# Patient Record
Sex: MALE | URBAN - METROPOLITAN AREA
[De-identification: names, ages, dates, MRNs, and addresses within clinical notes are randomized per-mention and may not be internally consistent; named-entity substitution may affect disease eponyms.]

---

## 2023-01-01 ENCOUNTER — NURSING HOME VISIT (OUTPATIENT)
Dept: POST ACUTE CARE | Facility: EXTERNAL LOCATION | Age: 73
End: 2023-01-01
Payer: MEDICARE

## 2023-01-01 DIAGNOSIS — E55.9 VITAMIN D DEFICIENCY: ICD-10-CM

## 2023-01-01 DIAGNOSIS — J44.89 COPD (CHRONIC OBSTRUCTIVE PULMONARY DISEASE) WITH CHRONIC BRONCHITIS (MULTI): Primary | ICD-10-CM

## 2023-01-01 DIAGNOSIS — J44.9 CHRONIC OBSTRUCTIVE PULMONARY DISEASE, UNSPECIFIED COPD TYPE (MULTI): ICD-10-CM

## 2023-01-01 DIAGNOSIS — K59.00 CONSTIPATION, UNSPECIFIED CONSTIPATION TYPE: ICD-10-CM

## 2023-01-01 DIAGNOSIS — E53.8 VITAMIN B 12 DEFICIENCY: ICD-10-CM

## 2023-01-01 DIAGNOSIS — R62.7 FAILURE TO THRIVE IN ADULT: ICD-10-CM

## 2023-01-01 DIAGNOSIS — J44.9 CHRONIC OBSTRUCTIVE PULMONARY DISEASE, UNSPECIFIED COPD TYPE (MULTI): Primary | ICD-10-CM

## 2023-01-01 DIAGNOSIS — J96.10 CHRONIC RESPIRATORY FAILURE, UNSPECIFIED WHETHER WITH HYPOXIA OR HYPERCAPNIA (MULTI): Primary | ICD-10-CM

## 2023-01-01 DIAGNOSIS — J96.10 CHRONIC RESPIRATORY FAILURE, UNSPECIFIED WHETHER WITH HYPOXIA OR HYPERCAPNIA (MULTI): ICD-10-CM

## 2023-01-01 DIAGNOSIS — J43.8 OTHER EMPHYSEMA (MULTI): Primary | ICD-10-CM

## 2023-01-01 DIAGNOSIS — F41.9 ANXIETY: ICD-10-CM

## 2023-01-01 PROCEDURE — 99309 SBSQ NF CARE MODERATE MDM 30: CPT | Performed by: NURSE PRACTITIONER

## 2023-01-01 PROCEDURE — 99304 1ST NF CARE SF/LOW MDM 25: CPT | Performed by: INTERNAL MEDICINE

## 2023-01-01 PROCEDURE — 99307 SBSQ NF CARE SF MDM 10: CPT | Performed by: NURSE PRACTITIONER

## 2023-01-01 PROCEDURE — 99308 SBSQ NF CARE LOW MDM 20: CPT | Performed by: INTERNAL MEDICINE

## 2023-01-01 PROCEDURE — 99308 SBSQ NF CARE LOW MDM 20: CPT | Performed by: NURSE PRACTITIONER

## 2023-01-01 ASSESSMENT — ENCOUNTER SYMPTOMS
APPETITE CHANGE: 1
SHORTNESS OF BREATH: 1
FEVER: 0
APPETITE CHANGE: 1
FATIGUE: 1
APPETITE CHANGE: 1
WHEEZING: 0
ABDOMINAL PAIN: 0
NAUSEA: 0
ABDOMINAL PAIN: 0
COUGH: 0
CHILLS: 0
DIAPHORESIS: 0
COUGH: 0
BACK PAIN: 1
WHEEZING: 0
FATIGUE: 1
GASTROINTESTINAL NEGATIVE: 1
VOMITING: 0
WEAKNESS: 1
CONSTIPATION: 1
COUGH: 1
ABDOMINAL PAIN: 1
PALPITATIONS: 0
PALPITATIONS: 0
FEVER: 0
FATIGUE: 1
DIARRHEA: 0
WHEEZING: 0
NAUSEA: 0
PALPITATIONS: 0
SHORTNESS OF BREATH: 1
VOMITING: 0
FATIGUE: 1
WEAKNESS: 1
DIARRHEA: 0
SHORTNESS OF BREATH: 1
SHORTNESS OF BREATH: 1
CONSTIPATION: 1
FATIGUE: 1
PALPITATIONS: 0
SHORTNESS OF BREATH: 1
FEVER: 0
PALPITATIONS: 0
FEVER: 0
COUGH: 0

## 2023-09-04 NOTE — LETTER
Patient: Chaz Ojeda  : 1950    Encounter Date: 2023    Pt was seen in the NH.  73 YO M  WITH PMH COPD HERE FOR CARE AFTER DC FOR COPD EXACERATION FEELS BETTER  General appearance: Comfortable, no distress  ROS: No SOB  Medications reviewed  Head: Normal  Neck: Soft  Heart: Regular  Lungs: DIMINISHED BS , SLIGHT WHEEZE  Abdomen: soft    Impression: REHAB, SUPPORTIVE CARE, continue current management    Problem List Items Addressed This Visit    None  Visit Diagnoses       Chronic obstructive pulmonary disease, unspecified COPD type (CMS/HCC)    -  Primary    Chronic respiratory failure, unspecified whether with hypoxia or hypercapnia (CMS/HCC)                   Electronically Signed By: Fco Jay MD   23  9:55 PM

## 2023-09-05 NOTE — LETTER
Patient: Chaz Ojeda  : 1950    Encounter Date: 2023    Subjective  Patient ID: Chaz Ojeda is a 72 y.o. male who presents for No chief complaint on file..  73 yo male at [facility] in rehab for weakness, GERD, Hyperlipidemia, GERD, FTT, anxiety and COPD.  Resident in room sitting in recliner chair.  States he has weakness and sob on minimal activity.  States he is getting anxiety at night and cannot sleep due to the anxiety.  Also c/o some constipation, recalls last BM 3 days ago.          Review of Systems   Constitutional:  Positive for appetite change and fatigue. Negative for chills and fever.   HENT: Negative.     Respiratory:  Positive for shortness of breath. Negative for cough and wheezing.    Cardiovascular:  Positive for leg swelling. Negative for chest pain and palpitations.   Gastrointestinal:  Positive for constipation. Negative for abdominal pain, diarrhea, nausea and vomiting.   Musculoskeletal:  Positive for back pain.   Neurological:  Positive for weakness.       Objective  Physical Exam  Constitutional:       General: He is not in acute distress.     Appearance: He is ill-appearing.   Cardiovascular:      Rate and Rhythm: Normal rate.      Pulses: Normal pulses.      Heart sounds: Normal heart sounds.   Pulmonary:      Effort: Pulmonary effort is normal.      Breath sounds: No wheezing, rhonchi or rales.      Comments: Barrel chest noted.  Diminished lung sounds posterior lower bases.  02 2L NC.    Abdominal:      General: Bowel sounds are normal. There is no distension.      Tenderness: There is no abdominal tenderness.   Skin:     General: Skin is warm and dry.   Neurological:      Mental Status: He is alert.      Motor: Weakness present.         No current outpatient medications on file.     Assessment/Plan  Problem List Items Addressed This Visit          Gastrointestinal and Abdominal    Constipation       Mental Health    Anxiety       Pulmonary and Pneumonias    Other emphysema  (CMS/[facility]) - Primary   Start on Miralax 70gm/1scoop daily prn for constipation.    He already has an order for Xanax 0.25mg q12hrs prn.  Will schedule Xanax 0.25mg at hs to help with his anxiety at hs.    Continue same meds for copd at this time.  Continue to monitor.             Electronically Signed By: BEAN Rehman   9/7/23 10:08 AM

## 2023-09-05 NOTE — PROGRESS NOTES
Pt was seen in the NH.  71 YO M  WITH PMH COPD HERE FOR CARE AFTER DC FOR COPD EXACERATION FEELS BETTER  General appearance: Comfortable, no distress  ROS: No SOB  Medications reviewed  Head: Normal  Neck: Soft  Heart: Regular  Lungs: DIMINISHED BS , SLIGHT WHEEZE  Abdomen: soft    Impression: REHAB, SUPPORTIVE CARE, continue current management    Problem List Items Addressed This Visit    None  Visit Diagnoses       Chronic obstructive pulmonary disease, unspecified COPD type (CMS/HCC)    -  Primary    Chronic respiratory failure, unspecified whether with hypoxia or hypercapnia (CMS/HCC)

## 2023-09-06 NOTE — LETTER
Patient: Chaz Ojeda  : 1950    Encounter Date: 2023    Subjective  Patient ID: Chaz Ojeda is a 72 y.o. male who presents for No chief complaint on file..  73 yo male at [facility] on rehab with history of copd, FTT.  Family and POA discussed discontinuing medications and considering Hospice in near future with nursing staff.  Resident in room states he is concerned he is not getting enough calories as recommended by hospital nutritionist.  Resident also concerned with cost of immunoglobulin injections monthly and his inhalers.  Labs reviewed with nursing staff.          Review of Systems   Constitutional:  Positive for appetite change and fatigue.   Respiratory:  Positive for shortness of breath.    Cardiovascular:  Positive for leg swelling. Negative for chest pain and palpitations.   Gastrointestinal:  Positive for constipation. Negative for abdominal pain, diarrhea, nausea and vomiting.       Objective  Physical Exam  Constitutional:       General: He is not in acute distress.     Appearance: He is ill-appearing.   Pulmonary:      Breath sounds: No wheezing, rhonchi or rales.      Comments: Diminished lung sounds posteriorly on expiration.  Barrel chest  Abdominal:      General: Bowel sounds are normal.   Neurological:      Mental Status: He is alert.         No current outpatient medications on file.     Assessment/Plan  Problem List Items Addressed This Visit          Endocrine/Metabolic    Failure to thrive in adult    Vitamin D deficiency    Vitamin B 12 deficiency       Pulmonary and Pneumonias    Other emphysema (CMS/HCC) - Primary   Will refer resident to [facility] nutritionist.  Resident weights daily.  Current weight 95#.    Staff to discuss with family if they want to pursue Vit B12 deficiency and Vit D deficiency; may help with his fatigue.    Discontinue Remeron, IV immunoglobulin, Reglan, Advair at the families request.    Hold Dexamethasone 4mg q8hrs until finished with tapering dose, then  resume Dexamethasone at 4mg BID to decrease possible insomnia.  Continue Lisinopril at 20mg BID.    Discussed with nursing staff.  Will continue to monitor.  Continue supportive care.             Electronically Signed By: BEAN Rehman   9/7/23 10:19 AM

## 2023-09-07 PROBLEM — E53.8 VITAMIN B 12 DEFICIENCY: Status: ACTIVE | Noted: 2023-01-01

## 2023-09-07 PROBLEM — J43.8 OTHER EMPHYSEMA (MULTI): Status: ACTIVE | Noted: 2023-01-01

## 2023-09-07 PROBLEM — K59.00 CONSTIPATION: Status: ACTIVE | Noted: 2023-01-01

## 2023-09-07 PROBLEM — R62.7 FAILURE TO THRIVE IN ADULT: Status: ACTIVE | Noted: 2023-01-01

## 2023-09-07 PROBLEM — E55.9 VITAMIN D DEFICIENCY: Status: ACTIVE | Noted: 2023-01-01

## 2023-09-07 PROBLEM — F41.9 ANXIETY: Status: ACTIVE | Noted: 2023-01-01

## 2023-09-07 NOTE — PROGRESS NOTES
Subjective   Patient ID: Chaz Ojeda is a 72 y.o. male who presents for No chief complaint on file..  73 yo male at [facility] on rehab with history of copd, FTT.  Family and POA discussed discontinuing medications and considering Hospice in near future with nursing staff.  Resident in room states he is concerned he is not getting enough calories as recommended by hospital nutritionist.  Resident also concerned with cost of immunoglobulin injections monthly and his inhalers.  Labs reviewed with nursing staff.          Review of Systems   Constitutional:  Positive for appetite change and fatigue.   Respiratory:  Positive for shortness of breath.    Cardiovascular:  Positive for leg swelling. Negative for chest pain and palpitations.   Gastrointestinal:  Positive for constipation. Negative for abdominal pain, diarrhea, nausea and vomiting.       Objective   Physical Exam  Constitutional:       General: He is not in acute distress.     Appearance: He is ill-appearing.   Pulmonary:      Breath sounds: No wheezing, rhonchi or rales.      Comments: Diminished lung sounds posteriorly on expiration.  Barrel chest  Abdominal:      General: Bowel sounds are normal.   Neurological:      Mental Status: He is alert.         No current outpatient medications on file.     Assessment/Plan   Problem List Items Addressed This Visit          Endocrine/Metabolic    Failure to thrive in adult    Vitamin D deficiency    Vitamin B 12 deficiency       Pulmonary and Pneumonias    Other emphysema (CMS/HCC) - Primary   Will refer resident to [facility] nutritionist.  Resident weights daily.  Current weight 95#.    Staff to discuss with family if they want to pursue Vit B12 deficiency and Vit D deficiency; may help with his fatigue.    Discontinue Remeron, IV immunoglobulin, Reglan, Advair at the families request.    Hold Dexamethasone 4mg q8hrs until finished with tapering dose, then resume Dexamethasone at 4mg BID to decrease possible insomnia.   Continue Lisinopril at 20mg BID.    Discussed with nursing staff.  Will continue to monitor.  Continue supportive care.

## 2023-09-07 NOTE — PROGRESS NOTES
Subjective   Patient ID: Chaz Ojeda is a 72 y.o. male who presents for No chief complaint on file..  71 yo male at [facility] in rehab for weakness, GERD, Hyperlipidemia, GERD, FTT, anxiety and COPD.  Resident in room sitting in recliner chair.  States he has weakness and sob on minimal activity.  States he is getting anxiety at night and cannot sleep due to the anxiety.  Also c/o some constipation, recalls last BM 3 days ago.          Review of Systems   Constitutional:  Positive for appetite change and fatigue. Negative for chills and fever.   HENT: Negative.     Respiratory:  Positive for shortness of breath. Negative for cough and wheezing.    Cardiovascular:  Positive for leg swelling. Negative for chest pain and palpitations.   Gastrointestinal:  Positive for constipation. Negative for abdominal pain, diarrhea, nausea and vomiting.   Musculoskeletal:  Positive for back pain.   Neurological:  Positive for weakness.       Objective   Physical Exam  Constitutional:       General: He is not in acute distress.     Appearance: He is ill-appearing.   Cardiovascular:      Rate and Rhythm: Normal rate.      Pulses: Normal pulses.      Heart sounds: Normal heart sounds.   Pulmonary:      Effort: Pulmonary effort is normal.      Breath sounds: No wheezing, rhonchi or rales.      Comments: Barrel chest noted.  Diminished lung sounds posterior lower bases.  02 2L NC.    Abdominal:      General: Bowel sounds are normal. There is no distension.      Tenderness: There is no abdominal tenderness.   Skin:     General: Skin is warm and dry.   Neurological:      Mental Status: He is alert.      Motor: Weakness present.         No current outpatient medications on file.     Assessment/Plan   Problem List Items Addressed This Visit          Gastrointestinal and Abdominal    Constipation       Mental Health    Anxiety       Pulmonary and Pneumonias    Other emphysema (CMS/HCC) - Primary   Start on Miralax 70gm/1scoop daily prn for  constipation.    He already has an order for Xanax 0.25mg q12hrs prn.  Will schedule Xanax 0.25mg at hs to help with his anxiety at hs.    Continue same meds for copd at this time.  Continue to monitor.

## 2023-09-08 NOTE — LETTER
Patient: Chaz Ojeda  : 1950    Encounter Date: 2023    Pt was seen in the NH.  Pt has off and on confusion,  General appearance: Comfortable, no distress  ROS: No SOB  Medications reviewed  Head: Normal  Neck: Soft  Heart: Regular  Lungs: diminished BS, slight wheezing  Abdomen: soft    Impression: has advanced disease , spoke with pt, comfort care recommended , pt in agreement, hopice referral, add z pac, UA Bw and CXR, prognosis poor, continue current management    Problem List Items Addressed This Visit       Failure to thrive in adult     Other Visit Diagnoses       Chronic respiratory failure, unspecified whether with hypoxia or hypercapnia (CMS/HCC)    -  Primary    Chronic obstructive pulmonary disease, unspecified COPD type (CMS/HCC)                   Electronically Signed By: Fco Jay MD   23 10:08 PM

## 2023-09-09 NOTE — PROGRESS NOTES
Pt was seen in the NH.  Pt has off and on confusion,  General appearance: Comfortable, no distress  ROS: No SOB  Medications reviewed  Head: Normal  Neck: Soft  Heart: Regular  Lungs: diminished BS, slight wheezing  Abdomen: soft    Impression: has advanced disease , spoke with pt, comfort care recommended , pt in agreement, hopice referral, add z pac, UA Bw and CXR, prognosis poor, continue current management    Problem List Items Addressed This Visit       Failure to thrive in adult     Other Visit Diagnoses       Chronic respiratory failure, unspecified whether with hypoxia or hypercapnia (CMS/HCC)    -  Primary    Chronic obstructive pulmonary disease, unspecified COPD type (CMS/HCC)

## 2023-09-11 NOTE — LETTER
Patient: Chaz Ojeda  : 1950    Encounter Date: 2023    Subjective  Patient ID: Chaz Ojeda is a 72 y.o. male who presents for No chief complaint on file..  71 yo male with history of resp failure, copd, hypoxemia.  Labs returned and CXR shows no acute process.  Currently on 4L 02 at 97% pulse ox.          Review of Systems   Constitutional:  Positive for fatigue. Negative for fever.   Respiratory:  Positive for shortness of breath. Negative for cough and wheezing.    Cardiovascular:  Positive for leg swelling. Negative for chest pain and palpitations.   Gastrointestinal: Negative.        Objective  Physical Exam  Constitutional:       General: He is not in acute distress.     Appearance: He is ill-appearing.   Cardiovascular:      Rate and Rhythm: Normal rate and regular rhythm.   Pulmonary:      Comments: Lungs diminished throughout A&P. Pulse ox 97% on RA.    Neurological:      Mental Status: He is alert.         No current outpatient medications on file.     Assessment/Plan  Problem List Items Addressed This Visit          Pulmonary and Pneumonias    COPD (chronic obstructive pulmonary disease) with chronic bronchitis (CMS/HCC) - Primary   Family has decided to wait on Hospice referral.  CXR shows no acute process; mild cardiomegaly.  Elevation of WBC.  Put on Zpak.  Continue to monitor.            Electronically Signed By: DARSHANA Rehman-CNP   23  2:45 PM

## 2023-09-12 NOTE — LETTER
Patient: Chaz Ojeda  : 1950    Encounter Date: 2023    Subjective  Patient ID: Chaz Ojeda is a 72 y.o. male who presents for No chief complaint on file..  73 yo male with history of resp failure, copd, hypoxemia.  Resident in and out of sleep during interview and exam.  States earlier today he was feeling nauseated but improved now.  C/o back pain however, reports his wife has always made him stay off narcotics for pain so he is reluctant to take anything but Tylenol.          Review of Systems   Constitutional:  Positive for fatigue. Negative for diaphoresis and fever.   Respiratory:  Positive for shortness of breath. Negative for cough and wheezing.    Cardiovascular:  Positive for leg swelling. Negative for chest pain and palpitations.       Objective  Physical Exam  Constitutional:       General: He is not in acute distress.     Appearance: He is ill-appearing.   Cardiovascular:      Rate and Rhythm: Normal rate.   Pulmonary:      Comments: Diminished throughout.          No current outpatient medications on file.     Assessment/Plan  Problem List Items Addressed This Visit          Pulmonary and Pneumonias    COPD (chronic obstructive pulmonary disease) with chronic bronchitis (CMS/HCC) - Primary   Awaiting additional labs.  Resident and family prefer Tylenol only for pain at this time.  Continue to monitor.             Electronically Signed By: DARSHANA Rehman-CNP   23  3:06 PM

## 2023-09-13 PROBLEM — J44.89 COPD (CHRONIC OBSTRUCTIVE PULMONARY DISEASE) WITH CHRONIC BRONCHITIS (MULTI): Status: ACTIVE | Noted: 2023-01-01

## 2023-09-13 NOTE — PROGRESS NOTES
Subjective   Patient ID: Chaz Ojeda is a 72 y.o. male who presents for No chief complaint on file..  73 yo male with history of resp failure, copd, hypoxemia.  Labs returned and CXR shows no acute process.  Currently on 4L 02 at 97% pulse ox.          Review of Systems   Constitutional:  Positive for fatigue. Negative for fever.   Respiratory:  Positive for shortness of breath. Negative for cough and wheezing.    Cardiovascular:  Positive for leg swelling. Negative for chest pain and palpitations.   Gastrointestinal: Negative.        Objective   Physical Exam  Constitutional:       General: He is not in acute distress.     Appearance: He is ill-appearing.   Cardiovascular:      Rate and Rhythm: Normal rate and regular rhythm.   Pulmonary:      Comments: Lungs diminished throughout A&P. Pulse ox 97% on RA.    Neurological:      Mental Status: He is alert.         No current outpatient medications on file.     Assessment/Plan   Problem List Items Addressed This Visit          Pulmonary and Pneumonias    COPD (chronic obstructive pulmonary disease) with chronic bronchitis (CMS/HCC) - Primary   Family has decided to wait on Hospice referral.  CXR shows no acute process; mild cardiomegaly.  Elevation of WBC.  Put on Zpak.  Continue to monitor.

## 2023-09-13 NOTE — PROGRESS NOTES
Subjective   Patient ID: Chaz Ojeda is a 72 y.o. male who presents for No chief complaint on file..  71 yo male with history of resp failure, copd, hypoxemia.  Resident in and out of sleep during interview and exam.  States earlier today he was feeling nauseated but improved now.  C/o back pain however, reports his wife has always made him stay off narcotics for pain so he is reluctant to take anything but Tylenol.          Review of Systems   Constitutional:  Positive for fatigue. Negative for diaphoresis and fever.   Respiratory:  Positive for shortness of breath. Negative for cough and wheezing.    Cardiovascular:  Positive for leg swelling. Negative for chest pain and palpitations.       Objective   Physical Exam  Constitutional:       General: He is not in acute distress.     Appearance: He is ill-appearing.   Cardiovascular:      Rate and Rhythm: Normal rate.   Pulmonary:      Comments: Diminished throughout.          No current outpatient medications on file.     Assessment/Plan   Problem List Items Addressed This Visit          Pulmonary and Pneumonias    COPD (chronic obstructive pulmonary disease) with chronic bronchitis (CMS/HCC) - Primary   Awaiting additional labs.  Resident and family prefer Tylenol only for pain at this time.  Continue to monitor.

## 2023-09-25 NOTE — LETTER
Patient: Chaz Ojeda  : 1950    Encounter Date: 2023    Subjective  Patient ID: Chaz Ojeda is a 72 y.o. male who presents for No chief complaint on file..  71 yo male at [facility] on rehab with history of copd, cardiomegaly, GERD, htn, pvd.  Staff reports resident has increased cough and sob.  Staff reports family would like to discontinue some of his medications due to his difficulty in swallowing medications.          Review of Systems   Constitutional:  Positive for appetite change and fatigue. Negative for fever.   Respiratory:  Positive for cough and shortness of breath.         Resident reports breathing treatments do not help his sob.     Cardiovascular:  Negative for chest pain, palpitations and leg swelling.   Gastrointestinal:  Positive for abdominal pain.        Staff reports resident was c/o cramping in his abd. At hs.    Neurological:  Positive for weakness.       Objective  Physical Exam  Constitutional:       General: He is in acute distress.      Appearance: He is ill-appearing.   Cardiovascular:      Rate and Rhythm: Rhythm irregular.      Comments: Barrel chested.  Pulmonary:      Breath sounds: Wheezing and rales present.   Abdominal:      Tenderness: There is no abdominal tenderness.      Comments: BS hypoactive x 4.    Skin:     General: Skin is warm and dry.   Neurological:      Mental Status: He is alert.      Motor: Weakness present.         No current outpatient medications on file.     Assessment/Plan  Problem List Items Addressed This Visit          Pulmonary and Pneumonias    COPD (chronic obstructive pulmonary disease) with chronic bronchitis (CMS/[facility]) - Primary   End stage COPD;  Will increase his Prednisone to 10mg daily.  Levsin 0.125mg q6hrs prn for secretions and abd. Cramping.  Morphine Sulfate (unable to get syringe for 2.5mg) will start with  5mg q6hrs prn for dyspnea.  Staff to continue to monitor closely.  Call if any changes in condition.              Electronically Signed By: DARSHANA Rehman-CNP   9/26/23  1:25 PM

## 2023-09-26 NOTE — PROGRESS NOTES
Subjective   Patient ID: Chaz Ojeda is a 72 y.o. male who presents for No chief complaint on file..  73 yo male at \[facility]\"" on rehab with history of copd, cardiomegaly, GERD, htn, pvd.  Staff reports resident has increased cough and sob.  Staff reports family would like to discontinue some of his medications due to his difficulty in swallowing medications.          Review of Systems   Constitutional:  Positive for appetite change and fatigue. Negative for fever.   Respiratory:  Positive for cough and shortness of breath.         Resident reports breathing treatments do not help his sob.     Cardiovascular:  Negative for chest pain, palpitations and leg swelling.   Gastrointestinal:  Positive for abdominal pain.        Staff reports resident was c/o cramping in his abd. At hs.    Neurological:  Positive for weakness.       Objective   Physical Exam  Constitutional:       General: He is in acute distress.      Appearance: He is ill-appearing.   Cardiovascular:      Rate and Rhythm: Rhythm irregular.      Comments: Barrel chested.  Pulmonary:      Breath sounds: Wheezing and rales present.   Abdominal:      Tenderness: There is no abdominal tenderness.      Comments: BS hypoactive x 4.    Skin:     General: Skin is warm and dry.   Neurological:      Mental Status: He is alert.      Motor: Weakness present.         No current outpatient medications on file.     Assessment/Plan   Problem List Items Addressed This Visit          Pulmonary and Pneumonias    COPD (chronic obstructive pulmonary disease) with chronic bronchitis (CMS/[facility]) - Primary   End stage COPD;  Will increase his Prednisone to 10mg daily.  Levsin 0.125mg q6hrs prn for secretions and abd. Cramping.  Morphine Sulfate (unable to get syringe for 2.5mg) will start with  5mg q6hrs prn for dyspnea.  Staff to continue to monitor closely.  Call if any changes in condition.

## 2023-09-28 NOTE — LETTER
Patient: Chaz Ojeda  : 1950    Encounter Date: 2023    Subjective  Patient ID: Chaz Ojeda is a 72 y.o. male who presents for No chief complaint on file..  71 yo male with history of copd.  Staff reports resident is not doing well today and he is scheduled for Hospice meeting today at 2pm.  Resident is non-responsive and non-verbal.          Review of Systems   Constitutional:  Positive for fever.   Respiratory:  Positive for shortness of breath.    Gastrointestinal:  Negative for abdominal distention.       Objective  Physical Exam  Constitutional:       Appearance: He is underweight. He is toxic-appearing.      Interventions: Nasal cannula in place.   Pulmonary:      Breath sounds: Rhonchi present.      Comments: Barrel chested  Skin:     General: Skin is warm and dry.      Comments: Mottling noted bilateral knees and feet.           No current outpatient medications on file.     Assessment/Plan  Problem List Items Addressed This Visit          Pulmonary and Pneumonias    COPD (chronic obstructive pulmonary disease) with chronic bronchitis - Primary   Resident will be placed on Hospice today.  Resident is actively dying.  Medications in place for comfort measures.             Electronically Signed By: BEAN Rehman   10/2/23  8:59 AM

## 2023-10-02 ASSESSMENT — ENCOUNTER SYMPTOMS
FEVER: 1
ABDOMINAL DISTENTION: 0
SHORTNESS OF BREATH: 1

## 2023-10-02 NOTE — PROGRESS NOTES
Subjective   Patient ID: Chaz Ojeda is a 72 y.o. male who presents for No chief complaint on file..  71 yo male with history of copd.  Staff reports resident is not doing well today and he is scheduled for Hospice meeting today at 2pm.  Resident is non-responsive and non-verbal.          Review of Systems   Constitutional:  Positive for fever.   Respiratory:  Positive for shortness of breath.    Gastrointestinal:  Negative for abdominal distention.       Objective   Physical Exam  Constitutional:       Appearance: He is underweight. He is toxic-appearing.      Interventions: Nasal cannula in place.   Pulmonary:      Breath sounds: Rhonchi present.      Comments: Barrel chested  Skin:     General: Skin is warm and dry.      Comments: Mottling noted bilateral knees and feet.           No current outpatient medications on file.     Assessment/Plan   Problem List Items Addressed This Visit          Pulmonary and Pneumonias    COPD (chronic obstructive pulmonary disease) with chronic bronchitis - Primary   Resident will be placed on Hospice today.  Resident is actively dying.  Medications in place for comfort measures.